# Patient Record
Sex: FEMALE | Race: WHITE | Employment: FULL TIME | URBAN - METROPOLITAN AREA
[De-identification: names, ages, dates, MRNs, and addresses within clinical notes are randomized per-mention and may not be internally consistent; named-entity substitution may affect disease eponyms.]

---

## 2024-07-09 RX ORDER — LANOLIN ALCOHOL/MO/W.PET/CERES
1000 CREAM (GRAM) TOPICAL DAILY
COMMUNITY

## 2024-07-09 RX ORDER — ROSUVASTATIN CALCIUM 10 MG/1
10 TABLET, COATED ORAL NIGHTLY
COMMUNITY
Start: 2021-10-19

## 2024-07-09 RX ORDER — MAGNESIUM 30 MG
30 TABLET ORAL 2 TIMES DAILY
COMMUNITY

## 2024-07-09 RX ORDER — ALBUTEROL SULFATE 90 UG/1
2 AEROSOL, METERED RESPIRATORY (INHALATION) EVERY 6 HOURS PRN
COMMUNITY

## 2024-07-09 NOTE — PERIOP NOTE
SURGICAL PRE-ADMISSION INSTRUCTIONS    Your Medication instructions:    Bring albuterol inhaler on day of surgery.  Follow surgeon's instructions for stopping NSAIDs (i.e. Ibuprofen/Motrin, Advil, Aleve/Naproxen, Voltaren cream or tablet, Mobic/Meloxicam, Excedrin, Celebrex, etc). If no instructions provided stop NSAIDs 7 days prior to surgery.    Stop supplements and vitamins (except multivitamins) 2 weeks prior to  surgery.    General Day Surgery Instructions    Do NOT eat or drink anything, including candy or gum, after MIDNIGHT on the night before surgery, unless you have specific instructions from your Surgeon or Anesthesia Provider to do so.   Please follow instructions for cleaning skin with CHG wash or antibacterial bar soap kit 3 days prior to surgery, or CHG wipes the night before surgery as directed.  Please shower with antibacterial bar soap 2 hours prior to arrival on day of surgery.  No smoking tobacco products 24 hours before surgery.  No recreational drugs for one week prior to the day of surgery.  Remove all jewelry, nail polish, makeup (including mascara); no lotions, powders, deodorant, or perfume/cologne/after shave.  Leave all valuables, including money/purse/wallet, jewelry, laptop, etc.. at home.  Bring durable medical equipment (DME) needed: (List DME)   Glasses/Contact lenses, Hearing aids, and Dentures may be worn to the hospital.  They will be removed prior to surgery.  Call your doctor if you have symptoms of a cold or illness, rash, wounds, or open areas on your skin prior to surgery.  Please notify Preanesthesia Testing if you have any medication changes after your PAT phone appointment.  Only 1 visitor is allowed to accompany you on the day of surgery.  AN ADULT MUST DRIVE YOU HOME AFTER OUTPATIENT SURGERY.   If you are having an OUTPATIENT procedure, please make arrangements for a responsible adult to be with you for 24 hours after your surgery.  If you are admitted to the hospital,

## 2024-07-15 NOTE — H&P
[facility]  [address]  [facility] [address]  Tel:  [phone]  Fax: [phone]    Patient: Sana Tyler    Preferred Name: Dana   YOB: 1965   Birth Sex: Female   Date: 07/15/2024 09:38 AM   Visit Type: Office Visit - GYN     Assessment/Plan  # Detail Type Description    1. Assessment Postmenopausal bleeding (N95.0).    Impression Postmenopausal bleeding with thickened endometrium on transvaginal ultrasound.  Plan hysteroscopy, D&C, possible polypectomy..         2. Assessment Thickened endometrium (R93.89).    Impression Patient with thickened endometrium and associated postmenopausal bleeding.  See 1..              This 59 year old patient presents for Postmenopausal Bleeding:.    History of Present Illness  1.  Postmenopausal Bleeding:   The symptoms began 1 month ago and generally lasts 5 Days. The symptoms are reported as being mild. The symptoms occur randomly. The location is uterine. The symptoms are described as painless. Aggravating factors include none. Relieving factors include time. The patient states the symptoms are acute and are unchanged. Patient with postmenopausal bleeding and thickened endometrium noted on transvaginal none.  Plan Hysteroscopy, D&C, possible polypectomy. I reviewed with patient risks/benefits/alternatives to surgery. She understands that the risks include but are not limited to: bleeding, need for blood transfusion, risks associated with blood transfusion, infection, injury to internal or adjacent organs, risks associated with receiving anesthesia, post operative complications, readmission to the hospital, wound infection or separation, dvt, pulmonary embolus, pneumonia and death associated with surgery. All questions were answered and surgical consent to be signed at hospital.            Gynecologic History  07/15/2024 09:38 AM  Date of last Pap: 07/29/2011.  Pregnancy # Birth order Date Delivery Type GA(wks)

## 2024-07-16 ENCOUNTER — ANESTHESIA EVENT (OUTPATIENT)
Facility: HOSPITAL | Age: 59
End: 2024-07-16
Payer: OTHER GOVERNMENT

## 2024-07-16 ENCOUNTER — HOSPITAL ENCOUNTER (OUTPATIENT)
Facility: HOSPITAL | Age: 59
Setting detail: OUTPATIENT SURGERY
Discharge: HOME | End: 2024-07-16
Attending: OBSTETRICS & GYNECOLOGY | Admitting: OBSTETRICS & GYNECOLOGY
Payer: OTHER GOVERNMENT

## 2024-07-16 ENCOUNTER — ANESTHESIA (OUTPATIENT)
Facility: HOSPITAL | Age: 59
End: 2024-07-16
Payer: OTHER GOVERNMENT

## 2024-07-16 VITALS
SYSTOLIC BLOOD PRESSURE: 121 MMHG | HEART RATE: 67 BPM | HEIGHT: 62 IN | RESPIRATION RATE: 16 BRPM | BODY MASS INDEX: 20.8 KG/M2 | DIASTOLIC BLOOD PRESSURE: 70 MMHG | WEIGHT: 113 LBS | OXYGEN SATURATION: 100 % | TEMPERATURE: 97 F

## 2024-07-16 PROBLEM — N95.0 POSTMENOPAUSAL BLEEDING: Status: ACTIVE | Noted: 2024-07-16

## 2024-07-16 PROBLEM — N95.0 POSTMENOPAUSAL BLEEDING: Status: RESOLVED | Noted: 2024-07-16 | Resolved: 2024-07-16

## 2024-07-16 PROCEDURE — 6360000002 HC RX W HCPCS: Performed by: NURSE ANESTHETIST, CERTIFIED REGISTERED

## 2024-07-16 PROCEDURE — 3600000002 HC SURGERY LEVEL 2 BASE: Performed by: OBSTETRICS & GYNECOLOGY

## 2024-07-16 PROCEDURE — 88305 TISSUE EXAM BY PATHOLOGIST: CPT

## 2024-07-16 PROCEDURE — 7100000000 HC PACU RECOVERY - FIRST 15 MIN: Performed by: OBSTETRICS & GYNECOLOGY

## 2024-07-16 PROCEDURE — 2580000003 HC RX 258: Performed by: OBSTETRICS & GYNECOLOGY

## 2024-07-16 PROCEDURE — 6360000002 HC RX W HCPCS: Performed by: OBSTETRICS & GYNECOLOGY

## 2024-07-16 PROCEDURE — 7100000011 HC PHASE II RECOVERY - ADDTL 15 MIN: Performed by: OBSTETRICS & GYNECOLOGY

## 2024-07-16 PROCEDURE — 7100000001 HC PACU RECOVERY - ADDTL 15 MIN: Performed by: OBSTETRICS & GYNECOLOGY

## 2024-07-16 PROCEDURE — 3700000000 HC ANESTHESIA ATTENDED CARE: Performed by: OBSTETRICS & GYNECOLOGY

## 2024-07-16 PROCEDURE — 2709999900 HC NON-CHARGEABLE SUPPLY: Performed by: OBSTETRICS & GYNECOLOGY

## 2024-07-16 PROCEDURE — 2500000003 HC RX 250 WO HCPCS: Performed by: NURSE ANESTHETIST, CERTIFIED REGISTERED

## 2024-07-16 PROCEDURE — 2720000010 HC SURG SUPPLY STERILE: Performed by: OBSTETRICS & GYNECOLOGY

## 2024-07-16 PROCEDURE — 7100000010 HC PHASE II RECOVERY - FIRST 15 MIN: Performed by: OBSTETRICS & GYNECOLOGY

## 2024-07-16 PROCEDURE — 3700000001 HC ADD 15 MINUTES (ANESTHESIA): Performed by: OBSTETRICS & GYNECOLOGY

## 2024-07-16 PROCEDURE — 3600000012 HC SURGERY LEVEL 2 ADDTL 15MIN: Performed by: OBSTETRICS & GYNECOLOGY

## 2024-07-16 RX ORDER — SODIUM CHLORIDE 0.9 % (FLUSH) 0.9 %
5-40 SYRINGE (ML) INJECTION EVERY 12 HOURS SCHEDULED
Status: DISCONTINUED | OUTPATIENT
Start: 2024-07-16 | End: 2024-07-16 | Stop reason: HOSPADM

## 2024-07-16 RX ORDER — FENTANYL CITRATE 50 UG/ML
INJECTION, SOLUTION INTRAMUSCULAR; INTRAVENOUS PRN
Status: DISCONTINUED | OUTPATIENT
Start: 2024-07-16 | End: 2024-07-16 | Stop reason: SDUPTHER

## 2024-07-16 RX ORDER — DEXAMETHASONE SODIUM PHOSPHATE 4 MG/ML
INJECTION, SOLUTION INTRA-ARTICULAR; INTRALESIONAL; INTRAMUSCULAR; INTRAVENOUS; SOFT TISSUE PRN
Status: DISCONTINUED | OUTPATIENT
Start: 2024-07-16 | End: 2024-07-16 | Stop reason: SDUPTHER

## 2024-07-16 RX ORDER — ONDANSETRON 2 MG/ML
4 INJECTION INTRAMUSCULAR; INTRAVENOUS
Status: DISCONTINUED | OUTPATIENT
Start: 2024-07-16 | End: 2024-07-16 | Stop reason: HOSPADM

## 2024-07-16 RX ORDER — OXYCODONE HYDROCHLORIDE 5 MG/1
5 TABLET ORAL
Status: DISCONTINUED | OUTPATIENT
Start: 2024-07-16 | End: 2024-07-16 | Stop reason: HOSPADM

## 2024-07-16 RX ORDER — NALOXONE HYDROCHLORIDE 0.4 MG/ML
INJECTION, SOLUTION INTRAMUSCULAR; INTRAVENOUS; SUBCUTANEOUS PRN
Status: DISCONTINUED | OUTPATIENT
Start: 2024-07-16 | End: 2024-07-16 | Stop reason: HOSPADM

## 2024-07-16 RX ORDER — SODIUM CHLORIDE, SODIUM LACTATE, POTASSIUM CHLORIDE, CALCIUM CHLORIDE 600; 310; 30; 20 MG/100ML; MG/100ML; MG/100ML; MG/100ML
INJECTION, SOLUTION INTRAVENOUS CONTINUOUS
Status: DISCONTINUED | OUTPATIENT
Start: 2024-07-16 | End: 2024-07-16 | Stop reason: HOSPADM

## 2024-07-16 RX ORDER — FENTANYL CITRATE 50 UG/ML
25 INJECTION, SOLUTION INTRAMUSCULAR; INTRAVENOUS EVERY 5 MIN PRN
Status: DISCONTINUED | OUTPATIENT
Start: 2024-07-16 | End: 2024-07-16 | Stop reason: HOSPADM

## 2024-07-16 RX ORDER — SODIUM CHLORIDE 9 MG/ML
INJECTION, SOLUTION INTRAVENOUS PRN
Status: DISCONTINUED | OUTPATIENT
Start: 2024-07-16 | End: 2024-07-16 | Stop reason: HOSPADM

## 2024-07-16 RX ORDER — LIDOCAINE HYDROCHLORIDE 20 MG/ML
INJECTION, SOLUTION EPIDURAL; INFILTRATION; INTRACAUDAL; PERINEURAL PRN
Status: DISCONTINUED | OUTPATIENT
Start: 2024-07-16 | End: 2024-07-16 | Stop reason: SDUPTHER

## 2024-07-16 RX ORDER — BUPIVACAINE HYDROCHLORIDE 2.5 MG/ML
INJECTION, SOLUTION EPIDURAL; INFILTRATION; INTRACAUDAL PRN
Status: DISCONTINUED | OUTPATIENT
Start: 2024-07-16 | End: 2024-07-16 | Stop reason: ALTCHOICE

## 2024-07-16 RX ORDER — SODIUM CHLORIDE 0.9 % (FLUSH) 0.9 %
5-40 SYRINGE (ML) INJECTION PRN
Status: DISCONTINUED | OUTPATIENT
Start: 2024-07-16 | End: 2024-07-16 | Stop reason: HOSPADM

## 2024-07-16 RX ORDER — MIDAZOLAM HYDROCHLORIDE 1 MG/ML
INJECTION INTRAMUSCULAR; INTRAVENOUS PRN
Status: DISCONTINUED | OUTPATIENT
Start: 2024-07-16 | End: 2024-07-16 | Stop reason: SDUPTHER

## 2024-07-16 RX ORDER — LABETALOL HYDROCHLORIDE 5 MG/ML
10 INJECTION, SOLUTION INTRAVENOUS
Status: DISCONTINUED | OUTPATIENT
Start: 2024-07-16 | End: 2024-07-16 | Stop reason: HOSPADM

## 2024-07-16 RX ORDER — GLYCOPYRROLATE 0.2 MG/ML
INJECTION INTRAMUSCULAR; INTRAVENOUS PRN
Status: DISCONTINUED | OUTPATIENT
Start: 2024-07-16 | End: 2024-07-16 | Stop reason: SDUPTHER

## 2024-07-16 RX ORDER — PROPOFOL 10 MG/ML
INJECTION, EMULSION INTRAVENOUS PRN
Status: DISCONTINUED | OUTPATIENT
Start: 2024-07-16 | End: 2024-07-16 | Stop reason: SDUPTHER

## 2024-07-16 RX ORDER — KETOROLAC TROMETHAMINE 30 MG/ML
INJECTION, SOLUTION INTRAMUSCULAR; INTRAVENOUS PRN
Status: DISCONTINUED | OUTPATIENT
Start: 2024-07-16 | End: 2024-07-16 | Stop reason: SDUPTHER

## 2024-07-16 RX ORDER — DROPERIDOL 2.5 MG/ML
0.62 INJECTION, SOLUTION INTRAMUSCULAR; INTRAVENOUS
Status: DISCONTINUED | OUTPATIENT
Start: 2024-07-16 | End: 2024-07-16 | Stop reason: HOSPADM

## 2024-07-16 RX ORDER — HYDROMORPHONE HYDROCHLORIDE 1 MG/ML
0.25 INJECTION, SOLUTION INTRAMUSCULAR; INTRAVENOUS; SUBCUTANEOUS EVERY 5 MIN PRN
Status: DISCONTINUED | OUTPATIENT
Start: 2024-07-16 | End: 2024-07-16 | Stop reason: HOSPADM

## 2024-07-16 RX ORDER — ONDANSETRON 2 MG/ML
INJECTION INTRAMUSCULAR; INTRAVENOUS PRN
Status: DISCONTINUED | OUTPATIENT
Start: 2024-07-16 | End: 2024-07-16 | Stop reason: SDUPTHER

## 2024-07-16 RX ORDER — DIPHENHYDRAMINE HYDROCHLORIDE 50 MG/ML
12.5 INJECTION INTRAMUSCULAR; INTRAVENOUS
Status: DISCONTINUED | OUTPATIENT
Start: 2024-07-16 | End: 2024-07-16 | Stop reason: HOSPADM

## 2024-07-16 RX ADMIN — SODIUM CHLORIDE, POTASSIUM CHLORIDE, SODIUM LACTATE AND CALCIUM CHLORIDE: 600; 310; 30; 20 INJECTION, SOLUTION INTRAVENOUS at 09:14

## 2024-07-16 RX ADMIN — GLYCOPYRROLATE 0.2 MG: 0.2 INJECTION INTRAMUSCULAR; INTRAVENOUS at 11:05

## 2024-07-16 RX ADMIN — KETOROLAC TROMETHAMINE 30 MG: 30 INJECTION, SOLUTION INTRAMUSCULAR; INTRAVENOUS at 11:21

## 2024-07-16 RX ADMIN — LIDOCAINE HYDROCHLORIDE 50 MG: 20 INJECTION, SOLUTION EPIDURAL; INFILTRATION; INTRACAUDAL; PERINEURAL at 10:59

## 2024-07-16 RX ADMIN — MIDAZOLAM 2 MG: 1 INJECTION INTRAMUSCULAR; INTRAVENOUS at 10:55

## 2024-07-16 RX ADMIN — FENTANYL CITRATE 50 MCG: 50 INJECTION, SOLUTION INTRAMUSCULAR; INTRAVENOUS at 10:58

## 2024-07-16 RX ADMIN — ONDANSETRON 4 MG: 2 INJECTION INTRAMUSCULAR; INTRAVENOUS at 11:06

## 2024-07-16 RX ADMIN — PROPOFOL 120 MG: 10 INJECTION, EMULSION INTRAVENOUS at 10:59

## 2024-07-16 RX ADMIN — DEXAMETHASONE SODIUM PHOSPHATE 4 MG: 4 INJECTION INTRA-ARTICULAR; INTRALESIONAL; INTRAMUSCULAR; INTRAVENOUS; SOFT TISSUE at 11:05

## 2024-07-16 ASSESSMENT — PAIN - FUNCTIONAL ASSESSMENT
PAIN_FUNCTIONAL_ASSESSMENT: NONE - DENIES PAIN
PAIN_FUNCTIONAL_ASSESSMENT: 0-10
PAIN_FUNCTIONAL_ASSESSMENT: ACTIVITIES ARE NOT PREVENTED

## 2024-07-16 ASSESSMENT — PAIN SCALES - GENERAL: PAINLEVEL_OUTOF10: 0

## 2024-07-16 ASSESSMENT — PAIN DESCRIPTION - DESCRIPTORS: DESCRIPTORS: CRAMPING

## 2024-07-16 NOTE — PERIOP NOTE
TRANSFER - IN REPORT:    Verbal report received from Fallon berry on Sana Tyler  being received from pacu for routine post-op      Report consisted of patient's Situation, Background, Assessment and   Recommendations(SBAR).     Information from the following report(s) Nurse Handoff Report was reviewed with the receiving nurse.    Opportunity for questions and clarification was provided.      Assessment completed upon patient's arrival to unit and care assumed.

## 2024-07-16 NOTE — ANESTHESIA PRE PROCEDURE
Department of Anesthesiology  Preprocedure Note       Name:  Sana Tyler   Age:  59 y.o.  :  1965                                          MRN:  [id number]         Date:  2024      Surgeon: Surgeon(s):  Ciara Frazier MD    Procedure: Procedure(s):  HYSTEROSCOPY, DILATATION AND CURETTAGE POSSIBLE POLYPECTOMY    Medications prior to admission:   Prior to Admission medications    Medication Sig Start Date End Date Taking? Authorizing Provider   rosuvastatin (CRESTOR) 10 MG tablet Take 1 tablet by mouth nightly 10/19/21  Yes Nikky Devries MD   albuterol sulfate HFA (VENTOLIN HFA) 108 (90 Base) MCG/ACT inhaler Inhale 2 puffs into the lungs every 6 hours as needed for Wheezing Uses prior to exercise. No formal asthma diagnosis   Yes Nikky Devries MD   NONFORMULARY Nutrafol. Collagen/biotin   Yes Nikky Devries MD   vitamin B-12 (CYANOCOBALAMIN) 1000 MCG tablet Take 1 tablet by mouth daily   Yes Nikky Devries MD   magnesium 30 MG tablet Take 1 tablet by mouth 2 times daily   Yes Nikky Devries MD   APPLE CIDER VINEGAR PO Take by mouth   Yes Nikky Devries MD       Current medications:    Current Facility-Administered Medications   Medication Dose Route Frequency Provider Last Rate Last Admin   • lactated ringers IV soln infusion   IntraVENous Continuous Ciara Frazier  mL/hr at 24 0914 New Bag at 24 0914       Allergies:    Allergies   Allergen Reactions   • Levofloxacin Rash   • Penicillins Rash       Problem List:    Patient Active Problem List   Diagnosis Code   • Postmenopausal bleeding N95.0       Past Medical History:        Diagnosis Date   • Cancer (HCC)     melanoma right leg (dermatologist) [facility]- Dr. Lizbet Méndez   • Hyperlipidemia        Past Surgical History:        Procedure Laterality Date   • COLONOSCOPY      x2 with mac   • TONSILLECTOMY         Social History:    Social History     Tobacco Use   •

## 2024-07-16 NOTE — ANESTHESIA POSTPROCEDURE EVALUATION
Post-Anesthesia Evaluation and Assessment    Cardiovascular Function/Vital Signs/Pain Score:  Vitals  BP: 101/63  Temp: 98.4 °F (36.9 °C)  Temp Source: Skin  Pulse: 61  Respirations: 15  SpO2: 99 %  Height: 157.5 cm (5' 2\")  Weight - Scale: 51.3 kg (113 lb)  Pain Level: 0    Patient is status post Procedure(s):  HYSTEROSCOPY, DILATATION AND CURETTAGE.    Nausea/Vomiting: Controlled.    Postoperative hydration reviewed and adequate.    Pain:  Managed    Mental Status and Level of Consciousness: Baseline and appropriate for discharge.    Adequate oxygenation and airway patent.    Complications related to anesthesia: None    Post-anesthesia assessment completed. No concerns.    Patient has met all discharge requirements.    Signed By: Diaz Arzola MD    July 16, 2024

## 2024-07-16 NOTE — PERIOP NOTE
Reviewed PTA medication list with patient/caregiver and patient/caregiver denies any additional medications.     Patient admits to having a responsible adult care for them at home for at least 24 hours after surgery.    Patient encouraged to use gown warming system and informed that using said warming gown to regulate body temperature prior to a procedure has been shown to help reduce the risks of blood clots and infection.    Patient's pharmacy of choice verified and documented in PTA medication section.    Dual skin assessment & fall risk band verification completed with KIRAN RHODES.

## 2024-07-16 NOTE — DISCHARGE INSTRUCTIONS
[facility], [address], [place] [address]  [facility], [address], [place] [address]  Office: [phone]  Fax:    [phone]    PROCEDURE: Procedure(s):  HYSTEROSCOPY, DILATATION AND CURETTAGE    NOTIFY [facility] IMMEDIATELY IF ANY OF THE FOLLOWING OCCUR:    You are unable to urinate.  Urgency to urinate is not uncommon.  Excessive vaginal bleeding > 1 maxi pad an hour for more then 2 hours straight.  Temperature above 101.0° and / or chills.  You are nauseous and / or vomiting and you cannot hold down any fluids.  Your pain is not controlled with the pain medication prescribed.    SPECIAL CONSIDERATIONS:     Do not drive for at least 24 hours after the procedure and until you are no longer taking narcotic pain medication and you are able to move and react without hesitation.  You may return to work in 1-2 days      [x] Pelvic rest (nothing in the vagina) for 1 week     [x] No heavy lifting over 10 pounds & no strenuous exercise for 1-2 days      [] Other instructions:         MEDICATIONS: PROVIDED AT DISCHARGE OR PREVIOUSLY PRESCRIBED AT PRE OPERATIVE APPOINTMENT WITH [facility] --  Narcotic Pain Med.   []  Norco/Vicodin   []  Percocet®   []  Dilaudid®    Non-narcotic Pain Med.  [x]   Ibuprofen        Antibiotics   []  Cipro®   []  Keflex®     []  Bactrim DS®       Urgency   []   Vesicare®       Nausea      []    Zofran®     []    Phenergan®       Other   []    Colace          [x] Rx provided to patient at pre operative appointment  [] Rx sent electronically today  [] Rx printed today      Our office staff will call you for a post operative check in 1-2 days. If you have not already scheduled your post operative appointment please do so with our office staff during this phone call. Your post operative appointment should be in 2 weeks.    Please contact [facility] at [phone] or go to the nearest Emergency Department /

## 2024-07-16 NOTE — INTERVAL H&P NOTE
Office H+P reviewed and updated. Pt seen and examined. No changes. Scanned to Epic.  Ciara Frazier M.D.

## (undated) DEVICE — SYMPHION OPERATIVE HYSTEROSCOPY SYSTEM RESECTING DEVICE FOR USE WITH SYMPHION CONTROLLER: Brand: SYMPHION RESECTING DEVICE

## (undated) DEVICE — GLOVE SURG SZ 65 CRM LTX FREE POLYISOPRENE POLYMER BEAD ANTI

## (undated) DEVICE — D&C HYSTEROSCOPY: Brand: MEDLINE INDUSTRIES, INC.

## (undated) DEVICE — SOLUTION IV 1000ML LAC RINGERS PH 6.5 INJ USP VIAFLX PLAS

## (undated) DEVICE — SYMPHION OPERATIVE HYSTEROSCOPY SYSTEM FLUID MANAGEMENT ACCESSORIES FOR USE WITH SYMPHION CONTROLLER: Brand: SYMPHION FLUID MANAGEMENT ACCESSORIES

## (undated) DEVICE — GARMENT,MEDLINE,DVT,INT,CALF,MED, GEN2: Brand: MEDLINE